# Patient Record
Sex: FEMALE | URBAN - METROPOLITAN AREA
[De-identification: names, ages, dates, MRNs, and addresses within clinical notes are randomized per-mention and may not be internally consistent; named-entity substitution may affect disease eponyms.]

---

## 2017-01-05 ENCOUNTER — TELEPHONE (OUTPATIENT)
Dept: GASTROENTEROLOGY | Facility: CLINIC | Age: 57
End: 2017-01-05

## 2017-01-05 NOTE — TELEPHONE ENCOUNTER
REFERRAL REVIEW FORM    Referred by: Dr. Toño Ambriz, PCP    Reason for referral: Chronic constipation    Date referral received: 12/22/16    Date records received: 12/22/16    Date records reviewed: 1/5/17    Previous work up:    Labs  EGD  Colonoscopy  GI evaluation - [facility]    Recommendation:    Schedule clinic appointment with general GI provider    When to schedule:  Next available slot    Date patient was contacted regarding scheduling:     Comments:   Pt previously saw [facility].

## 2017-01-09 NOTE — TELEPHONE ENCOUNTER
Spoke with medical records in Dr Toño Mckinney MD office. Waiting for colonoscpy report from 2014.Egd was done through another location she will look to see if patient had it done and will send it along with colonoscopy report.I will upload STAT once received.

## 2017-01-09 NOTE — TELEPHONE ENCOUNTER
Colonoscopy reports received will send to urgent scanning.Patient did not have Egd done but was called to schedule Upper GI small bowel follow through and will have that done today.

## 2017-01-09 NOTE — TELEPHONE ENCOUNTER
Called patient lvm to inform her we will need colonoscopy and egds she had done as referenced in her records. Our reviewing provider Dr Whalen would like to take a look at the reports before patients appointment.Per recommendations patient can be scheduled next available opening with general Gi provider. Waiting for call back to schedule.

## 2017-01-16 ENCOUNTER — TRANSFERRED RECORDS (OUTPATIENT)
Dept: HEALTH INFORMATION MANAGEMENT | Facility: CLINIC | Age: 57
End: 2017-01-16

## 2017-01-18 ENCOUNTER — DOCUMENTATION ONLY (OUTPATIENT)
Dept: GASTROENTEROLOGY | Facility: CLINIC | Age: 57
End: 2017-01-18

## 2017-01-18 NOTE — PROGRESS NOTES
Imaging report fro ugi xray and small bowel follow through received and sent to be scanned.  Route message to Ms. Walters  Borne next appt March.

## 2017-01-26 NOTE — TELEPHONE ENCOUNTER
Per Referral patient can be scheduled with Gi. Patient scheduled to see chava 3/24. Patient is aware.

## 2017-03-08 ENCOUNTER — TRANSFERRED RECORDS (OUTPATIENT)
Dept: HEALTH INFORMATION MANAGEMENT | Facility: CLINIC | Age: 57
End: 2017-03-08

## 2017-04-18 ENCOUNTER — PRE VISIT (OUTPATIENT)
Dept: GASTROENTEROLOGY | Facility: CLINIC | Age: 57
End: 2017-04-18

## 2017-04-18 NOTE — TELEPHONE ENCOUNTER
1.  Date/reason for appt: 4/28/17 chronic constipation   2.  Referring provider: none listed   3.  Call to patient (Yes / No - short description): no, records received   4.  Previous care at / records requested from: [facility]  5.  Other: Records received from [facility].   Included  Office notes: 11/28/16, 10/21/16, 10/18/16, 10/6/16, 9/6/16   [facility] notes: 11/8/15, 4/4/14   [facility] notes: 12/14/12  Radiology reports: abdomen on 10/4/16, 9/27/16, 4/21/16- [facility]    MRI lumbar on 9/8/16  Op/Procedure notes: upper GI endoscopy on 10/30/15- [facility]   Colonoscopy on 4/4/14-[facility]   Colonoscopy on 11/26/07-[facility]  Other: labs

## 2017-04-23 ASSESSMENT — ENCOUNTER SYMPTOMS
EXERCISE INTOLERANCE: 0
NUMBNESS: 0
SLEEP DISTURBANCES DUE TO BREATHING: 0
PANIC: 0
TROUBLE SWALLOWING: 0
DYSPNEA ON EXERTION: 1
DECREASED LIBIDO: 1
DOUBLE VISION: 0
NAIL CHANGES: 0
HALLUCINATIONS: 0
DIARRHEA: 0
HEMOPTYSIS: 0
MYALGIAS: 1
NECK PAIN: 1
NECK MASS: 0
ALTERED TEMPERATURE REGULATION: 0
INCREASED ENERGY: 1
TACHYCARDIA: 0
DECREASED APPETITE: 0
POOR WOUND HEALING: 0
BOWEL INCONTINENCE: 0
BLOATING: 1
DEPRESSION: 0
HEMATURIA: 0
TINGLING: 0
POLYDIPSIA: 0
POLYPHAGIA: 0
SINUS PAIN: 1
HYPERTENSION: 0
BLOOD IN STOOL: 0
LOSS OF CONSCIOUSNESS: 0
SINUS CONGESTION: 0
ABDOMINAL PAIN: 1
COUGH: 0
STIFFNESS: 1
FEVER: 0
TREMORS: 0
CONSTIPATION: 1
SORE THROAT: 0
POSTURAL DYSPNEA: 0
INSOMNIA: 1
SPUTUM PRODUCTION: 0
SPEECH CHANGE: 0
RESPIRATORY PAIN: 0
WEIGHT LOSS: 0
HOT FLASHES: 1
MUSCLE WEAKNESS: 1
MEMORY LOSS: 0
DIFFICULTY URINATING: 1
DIZZINESS: 1
LIGHT-HEADEDNESS: 0
VOMITING: 0
ORTHOPNEA: 0
PARALYSIS: 0
FATIGUE: 1
JAUNDICE: 0
WEIGHT GAIN: 1
BACK PAIN: 1
CHILLS: 0
NAUSEA: 1
DYSURIA: 0
MUSCLE CRAMPS: 1
EYE PAIN: 0
SKIN CHANGES: 0
LEG PAIN: 0
RECTAL PAIN: 1
FLANK PAIN: 0
CLAUDICATION: 0
LEG SWELLING: 0
DECREASED CONCENTRATION: 1
SNORES LOUDLY: 1
JOINT SWELLING: 1
HYPOTENSION: 0
EYE WATERING: 0
NERVOUS/ANXIOUS: 1
EYE REDNESS: 0
TASTE DISTURBANCE: 1
HEARTBURN: 1
WHEEZING: 0
COUGH DISTURBING SLEEP: 0
SHORTNESS OF BREATH: 1
DISTURBANCES IN COORDINATION: 0
SMELL DISTURBANCE: 0
WEAKNESS: 1
HEADACHES: 1
NIGHT SWEATS: 1
EYE IRRITATION: 0
SEIZURES: 0
PALPITATIONS: 1
HOARSE VOICE: 0
RECTAL BLEEDING: 0
SYNCOPE: 0
ARTHRALGIAS: 1

## 2017-04-28 ENCOUNTER — OFFICE VISIT (OUTPATIENT)
Dept: GASTROENTEROLOGY | Facility: CLINIC | Age: 57
End: 2017-04-28

## 2017-04-28 VITALS
DIASTOLIC BLOOD PRESSURE: 74 MMHG | SYSTOLIC BLOOD PRESSURE: 129 MMHG | WEIGHT: 155 LBS | OXYGEN SATURATION: 98 % | HEART RATE: 69 BPM | HEIGHT: 64 IN | BODY MASS INDEX: 26.46 KG/M2

## 2017-04-28 DIAGNOSIS — H91.92 HL (HEARING LOSS), LEFT: ICD-10-CM

## 2017-04-28 DIAGNOSIS — M19.90 ARTHRITIS: ICD-10-CM

## 2017-04-28 DIAGNOSIS — K90.9 STEATORRHEA: ICD-10-CM

## 2017-04-28 DIAGNOSIS — K59.00 CONSTIPATION, UNSPECIFIED CONSTIPATION TYPE: ICD-10-CM

## 2017-04-28 DIAGNOSIS — R13.19 ESOPHAGEAL DYSPHAGIA: Primary | ICD-10-CM

## 2017-04-28 RX ORDER — MULTIVITAMIN WITH IRON
1 TABLET ORAL DAILY
COMMUNITY

## 2017-04-28 RX ORDER — TRAZODONE HYDROCHLORIDE 50 MG/1
100 TABLET, FILM COATED ORAL
COMMUNITY
Start: 2013-03-28 | End: 2017-08-11

## 2017-04-28 RX ORDER — METFORMIN HCL 500 MG
1000 TABLET, EXTENDED RELEASE 24 HR ORAL DAILY
COMMUNITY

## 2017-04-28 RX ORDER — DIETHYLPROPION HYDROCHLORIDE 25 MG/1
25 TABLET ORAL
COMMUNITY
End: 2017-08-11

## 2017-04-28 RX ORDER — CHLORAL HYDRATE 500 MG
3 CAPSULE ORAL 3 TIMES DAILY
COMMUNITY

## 2017-04-28 ASSESSMENT — PAIN SCALES - GENERAL: PAINLEVEL: MODERATE PAIN (4)

## 2017-04-28 NOTE — PATIENT INSTRUCTIONS
Follow lifestyle precautions against acid reflux (GERD)    Do not overeat.   Avoid meals and snacks within three or four hours of lying down.   Avoid alcohol and mint. Decrease or quit smoking.   Do not drink carbonation - it IS acid. Decrease or quit caffeine.   If you have nighttime symptoms, elevate the head of the bed    first 3 inches, then 6 to 8 inches.    A foam wedge from the hip may be helpful, if a person lies on their back.    Pillows do NOT work. The entire back must be straight.   Lose weight if you are overweight.    Even ten pounds weight loss can make a difference.  Some people also have specific triggers: tomato, spice, chocolate, citrus/orange juice.      Continue the linaclotide (Linzess)     Consideration for Trulance (prucalapride)     Esophageal manometry (motility) and pH impedence studies are advised.  This is done by a nurse in the Endoscopy department in the [facility].     To schedule/rescheduled the tests or for questions regarding the tests please call [phone].     Esophageal Manometry (AKA Motility Study) - Esophageal manometry is a test to measure the strength and function of the esophagus (the  food pipe ). During the test, lubrication will be placed in your nose and a small tube is placed through your nose and moved down your esophagus and into your stomach. You will usually lie down for this test. You will be given 10 swallows of salt water.   This test takes 1 hour. After this test is completed, a 24 hour PH test will be completed.     24 Hour PH Impedance - In this test, the first tube, from the Manometry, is replaced with a smaller one (the size of a spaghetti noodle). The tube is taped into place and attached to a recorder that you will wear home. This machine will record how many episodes of reflux you have during the next 24 hours.   You will return the next day to return the recorder and the tube will be removed (this part only takes 5 minutes).    Preparations for  Manometry and 24 Hour PH Impedance Testing:   Be sure to talk to your doctor about medications you take.    Sometimes certain medications are stopped.   Be sure not to smoke or chew gum for up to 12 hours before the test.   Do not eat or drink for 4 hours before the test.     The results of these studies often take up to 4 weeks to get back.   If you have not received your results within 5 weeks please call the nurse coordinator to check the status at [phone].     the upper GI endoscopy (EGD) with dilation  UPPER ENDOSCOPY (also known as EGD- esophageogastroduodenoscopy)    To schedule, please call [phone], or you can schedule at the  after your appointment.    You will need a  to bring you home from the exam.  You may not take a cab home unless you have an adult with you.    If you have diabetes or are on blood thinners: talk with your doctor at least one week before the exam.  They may want you to change your medicine before the test.    This exam looks at the lining of your esophagus, stomach and duodenum (first part of the small intestine).    Do not eat of drink anything for 6-8 hours before your exam.    During this procedure, the doctor will help you to swallow a flexible tube called an endoscope. This endoscope has a small camera that lets the doctor see inside your body. The exam last from 10-20 minutes.    A small IV will be placed in your hand or arm, and medicine will be given to you through this IV to help you relax and reduce pain. They will spray your throat with numbing medicine and ask you to swallow to assist the doctor in passing the tube into your stomach. Also, biopsies may be taken to test in the lab and pictures may be taken of your esophagus, stomach, and duodenum.    You will rest in the recovery room for 30-60 minutes following the exam. Your throat may be numb for a short while and may be sore for a few days.     Return to GI Clinic 3 months       Selena Aguilar,  CFNP Gastroenterology   For appointments call [facility], [phone]  Coordinator  [phone] [place] or call -  GI Nurse Triage  [phone] for Medical Questions, # 3  Fax results to

## 2017-04-28 NOTE — NURSING NOTE
"Chief Complaint   Patient presents with     Consult     Constipation       Vitals:    04/28/17 1235   BP: 129/74   Pulse: 69   SpO2: 98%   Weight: 70.3 kg (155 lb)   Height: 1.626 m (5' 4\")       Body mass index is 26.61 kg/(m^2).                          "

## 2017-04-28 NOTE — LETTER
4/28/2017       RE: Jaylene Pillai  [address] [address]     Dear Colleague,    Thank you for referring your patient, Jaylene Pillai, to the [facility] GASTROENTEROLOGY AND IBD at [facility]. Please see a copy of my visit note below.    CHIEF COMPLAINT: constipation and esophageal dysphagia    HPI: Jaylene is a 57 year old woman with a recurrent esophageal dysphagia and chronic constipation of about 15 years. She has history of upper GI endoscopy (EGD) with dilation on two previous procedures, with temporary resolution of food impaction each time. Now, her dysphagia is worsening for foods. She does not have cough or choke with liquids. She notes increase in acid reflux (GERD) symptoms with no known reason. She has symptoms of acid reflux (GERD) about once weekly even avoiding wine and spice. She has increase in burping and belching and some nausea but no vomiting. She has normal appetite, no early satiety, no late burps of tastes from earlier foods.    Jaylene has worsened constipation as well. She has used fibers, Miralax, senna, bisacodyl, lactulose, TWE, docusate, and other agents in the past. She is now on linaclotide 290 mcg; the lower dose did not work for her [an uncommon difference]. If she uses it daily, she will have daily loose stool, which overall is better than other means of managing her constipation. It also decreases her bloat much. Last fall she was seen by [facility] and they said, they did n't know what to do for her. After three weeks no BM, at ER she was given an injection and told to go home for BM, but had none. She has had transit marker study, pelvic floor training, no benefit from that and stated clear ability to relax the pelvic floor. Last colonoscopy was 2014; she has no history of polyps. She has had surgical repair of rectocele and no longer needs to splint.    Jaylene has seen bariatric medicine for weight loss and has a new medication,  "diethylpropion. She does not link symptoms with this medication.    Jaylene has chronic joint pain, cartilage loss, degenerative disk disease and is LILIA+. She carried the diagnosis of spondiyloarthropathy. She was on Enbrell a couple years and felt great. She then had a sudden left hearing loss and total body rash, which were attributed to a virus. She was seen at [facility] who did no investigations. She has used naproxen 3-4 times a week on and off. She uses Tramadol about once a month.     Medical History: reviewed in outside records. Partially updated in the local record.    Surgical History: reviewed, updated, and annotated.. Cholecystectomy this year Feb decreased RUQ pain.    Family History: mother had rheumatoid arthritis, maternal aunt had Celiac Disease. A cousine has scleroderma. Sone with back and intestinal issues; tested negative for Celiac Disease.    Medication(s). Reviewed and updated. Has stopped the cyclobenzaprine with no benefit for her swallowing.    Adverse reactions: Reviewed.    Social history: . Non-smoker. Little infrequent alcohol. No street drugs.     REVIEW OF SYSTEMS:: largely positive in multiple symptoms. No acute illness or localized infection. UTI - received Bactrim x1 and Augmentin x1 late 2016. Not short of breath. No melena or hematochezia. Questionnaire reviewed. Significant elements discussed.    OBJECTIVE: /74  Pulse 69  Ht 1.626 m (5' 4\")  Wt 70.3 kg (155 lb)  SpO2 98%  BMI 26.61 kg/m2  Clean, well-nourished woman in no visible distress when seated. Moves slowly on her feet.  Eyes arfe clear.  Breathing is grossly normal.  Abdomen is non-tender.      RESULTS:    UGI SBFT 1/2017 normal  .  Abdomen films 2016 x3 constipation one time, no significant GI abnormality.  Colonoscopy 4/2014 no polyps  Transit marker study  Date? 14 markers remaining, 11 in rectosigmoid.  [facility] studies 12/2012 rectocele, possible non-relaxation, otherwise " normal.  Labs 2017 normal CBC, CMP, Ferritin, UA. Thyroid studies with low TSH - dose decreased. Mixed hyperlipidemia.    ASSESSMENT: 57 year old woman with recurrent esophageal dysphagia to solids, with previous dilation providing short-lived resolution of symptoms, dysphagia now worsening. She also has history of Irritable Bowel Syndrome per outside record and constipation of 15 or so years. Constipation is moderately well managed with linaclotide higher dose only, resulting in loose stool. Stool is nonetheless often malodorous and occasionally oily appearing, raising the question of steatorrhea.       Note: in the future, she may be a candidate for plecanatide, for management of constipation with less resulting diarrhea.     PLAN:    1. Upper GI endoscopy (EGD) with possible dilation  2. Esophagus manometry and 24 hour pH/impedance  3. Stool for elastase  4. Continue to follow acid reflux (GERD) precautions.  5. Continue bowel management.  6. Call or message with GI changes.  7. Return to GI Clinic in a couple months.    We discussed the history/results, assessment and plan. Specifically, the nature of the esophageal studies and reasons for them were discussed. ruling out other disease causing dysphagia and documenting need for acid control would be important. She has no symptoms of gastroparesis. Her new medication may be the cause of nausea, possible increase in acid reflux (GERD), as well as constipation.  Questions were answered. Written notes were provided.    There were no barriers to learning found.    Total visit 60 min, counseling time 40 min.            Answers for HPI/ROS submitted by the patient on 4/23/2017   General Symptoms: Yes  Skin Symptoms: Yes  HENT Symptoms: Yes  EYE SYMPTOMS: Yes  HEART SYMPTOMS: Yes  LUNG SYMPTOMS: Yes  INTESTINAL SYMPTOMS: Yes  URINARY SYMPTOMS: Yes  GYNECOLOGIC SYMPTOMS: Yes  BREAST SYMPTOMS: No  SKELETAL SYMPTOMS: Yes  BLOOD SYMPTOMS: No  NERVOUS SYSTEM SYMPTOMS:  Yes  MENTAL HEALTH SYMPTOMS: Yes  Fever: No  Loss of appetite: No  Weight loss: No  Weight gain: Yes  Fatigue: Yes  Night sweats: Yes  Chills: No  Increased stress: Yes  Excessive hunger: No  Excessive thirst: No  Feeling hot or cold when others believe the temperature is normal: No  Loss of height: No  Post-operative complications: No  Surgical site pain: No  Hallucinations: No  Change in or Loss of Energy: Yes  Hyperactivity: No  Confusion: No  Changes in hair: Yes  Changes in moles/birth marks: No  Itching: No  Rashes: No  Changes in nails: No  Acne: No  Hair in places you don't want it: No  Change in facial hair: No  Warts: No  Non-healing sores: No  Scarring: No  Flaking of skin: Yes  Color changes of hands/feet in cold : No  Sun sensitivity: No  Skin thickening: No  Ear pain: No  Ear discharge: No  Hearing loss: Yes  Tinnitus: Yes  Nosebleeds: No  Congestion: No  Sinus pain: Yes  Trouble swallowing: No   Voice hoarseness: No  Mouth sores: No  Sore throat: No  Tooth pain: No  Gum tenderness: No  Bleeding gums: No  Change in taste: Yes  Change in sense of smell: No  Dry mouth: No  Hearing aid used: Yes  Neck lump: No  Eye pain: No  Vision loss: No  Dry eyes: Yes  Watery eyes: No  Eye bulging: No  Double vision: No  Flashing of lights: No  Spots: No  Floaters: Yes  Redness: No  Crossed eyes: No  Tunnel Vision: No  Yellowing of eyes: No  Eye irritation: No  Cough: No  Sputum or phlegm: No  Coughing up blood: No  Difficulty breating or shortness of breath: Yes  Snoring: Yes  Wheezing: No  Difficulty breathing on exertion: Yes  Respiratory pain: No  Nighttime Cough: No  Difficulty breathing when lying flat: No  Chest pain or pressure: No  Fast or irregular heartbeat: Yes  Pain in legs with walking: No  Swelling in feet or ankles: No  Trouble breathing while lying down: No  Fingers or Toes appear blue: No  High blood pressure: No  Low blood pressure: No  Fainting: No  Murmurs: No  Chest pain on exertion: No  Chest  pain at rest: No  Cramping pain in leg during exercise: No  Pacemaker: No  Varicose veins: Yes  Edema or swelling: Yes  Fast heart beat: No  Wake up at night with shortness of breath: No  Heart flutters: Yes  Light-headedness: No  Exercise intolerance: No  Heart burn or indigestion: Yes  Nausea: Yes  Vomiting: No  Abdominal pain: Yes  Bloating: Yes  Constipation: Yes  Diarrhea: No  Blood in stool: No  Black stools: No  Rectal or Anal pain: Yes  Fecal incontinence: No  Rectal bleeding: No  Yellowing of skin or eyes: No  Vomit with blood: No  Change in stools: No  Hemorrhoids: Yes  Trouble holding urine or incontinence: No  Pain or burning: No  Trouble starting or stopping: Yes  Blood in urine: No  Decreased frequency of urination: No  Frequent nighttime urination: No  Flank pain: No  Difficulty emptying bladder: Yes  Back pain: Yes  Muscle aches: Yes  Neck pain: Yes  Swollen joints: Yes  Joint pain: Yes  Bone pain: No  Muscle cramps: Yes  Muscle weakness: Yes  Joint stiffness: Yes  Bone fracture: No  Trouble with coordination: No  Dizziness or trouble with balance: Yes  Fainting or black-out spells: No  Memory loss: No  Headache: Yes  Seizures: No  Speech problems: No  Tingling: No  Tremor: No  Weakness: Yes  Difficulty walking: No  Paralysis: No  Numbness: No  Bleeding or spotting between periods: No  Heavy or painful periods: No  Irregular periods: No  Vaginal discharge: No  Hot flashes: Yes  Vaginal dryness: Yes  Genital ulcers: No  Reduced libido: Yes  Painful intercourse: Yes  Difficulty with sexual arousal: Yes  Post-menopausal bleeding: No  Nervous or Anxious: Yes  Depression: No  Trouble sleeping: Yes  Trouble thinking or concentrating: Yes  Mood changes: Yes  Panic attacks: No      Again, thank you for allowing me to participate in the care of your patient.      Sincerely,    YAHIR Mehta CNP

## 2017-04-28 NOTE — MR AVS SNAPSHOT
After Visit Summary   4/28/2017    Jaylene Pillai    MRN: [id number]           Patient Information     Date Of Birth          1960        Visit Information        Provider Department      4/28/2017 1:00 PM Selena Aguilar APRN CNP [facility]        Today's Diagnoses     Esophageal dysphagia    -  1    Constipation, unspecified constipation type        Steatorrhea          Care Instructions    Follow lifestyle precautions against acid reflux (GERD)    Do not overeat.   Avoid meals and snacks within three or four hours of lying down.   Avoid alcohol and mint. Decrease or quit smoking.   Do not drink carbonation - it IS acid. Decrease or quit caffeine.   If you have nighttime symptoms, elevate the head of the bed    first 3 inches, then 6 to 8 inches.    A foam wedge from the hip may be helpful, if a person lies on their back.    Pillows do NOT work. The entire back must be straight.   Lose weight if you are overweight.    Even ten pounds weight loss can make a difference.  Some people also have specific triggers: tomato, spice, chocolate, citrus/orange juice.      Continue the linaclotide (Linzess)     Consideration for Trulance (prucalapride)     Esophageal manometry (motility) and pH impedence studies are advised.  This is done by a nurse in the Endoscopy department in the [facility] hospital.     To schedule/rescheduled the tests or for questions regarding the tests please call [phone].     Esophageal Manometry (AKA Motility Study) - Esophageal manometry is a test to measure the strength and function of the esophagus (the  food pipe ). During the test, lubrication will be placed in your nose and a small tube is placed through your nose and moved down your esophagus and into your stomach. You will usually lie down for this test. You will be given 10 swallows of salt water.   This test takes 1 hour. After this test is completed, a 24 hour PH test will be completed.     24 Hour  PH Impedance - In this test, the first tube, from the Manometry, is replaced with a smaller one (the size of a spaghetti noodle). The tube is taped into place and attached to a recorder that you will wear home. This machine will record how many episodes of reflux you have during the next 24 hours.   You will return the next day to return the recorder and the tube will be removed (this part only takes 5 minutes).    Preparations for Manometry and 24 Hour PH Impedance Testing:   Be sure to talk to your doctor about medications you take.    Sometimes certain medications are stopped.   Be sure not to smoke or chew gum for up to 12 hours before the test.   Do not eat or drink for 4 hours before the test.     The results of these studies often take up to 4 weeks to get back.   If you have not received your results within 5 weeks please call the nurse coordinator to check the status at [phone].     the upper GI endoscopy (EGD) with dilation  UPPER ENDOSCOPY (also known as EGD- esophageogastroduodenoscopy)    To schedule, please call [phone], or you can schedule at the  after your appointment.    You will need a  to bring you home from the exam.  You may not take a cab home unless you have an adult with you.    If you have diabetes or are on blood thinners: talk with your doctor at least one week before the exam.  They may want you to change your medicine before the test.    This exam looks at the lining of your esophagus, stomach and duodenum (first part of the small intestine).    Do not eat of drink anything for 6-8 hours before your exam.    During this procedure, the doctor will help you to swallow a flexible tube called an endoscope. This endoscope has a small camera that lets the doctor see inside your body. The exam last from 10-20 minutes.    A small IV will be placed in your hand or arm, and medicine will be given to you through this IV to help you relax and reduce pain. They will spray  your throat with numbing medicine and ask you to swallow to assist the doctor in passing the tube into your stomach. Also, biopsies may be taken to test in the lab and pictures may be taken of your esophagus, stomach, and duodenum.    You will rest in the recovery room for 30-60 minutes following the exam. Your throat may be numb for a short while and may be sore for a few days.     Return to GI Clinic 3 months       [facility]   For appointments call Angelina, [phone]  Coordinator  [phone] Sharon or call -  GI Nurse Triage  [phone] for Medical Questions, # 3  Fax results to                Follow-ups after your visit        Additional Services     GASTROENTEROLOGY ADULT REF PROCEDURE ONLY       Last Lab Result: No results found for: CR  Body mass index is 26.61 kg/(m^2).     Needed:  No  Language:  English    Patient will be contacted to schedule procedure.     Please be aware that coverage of these services is subject to the terms and limitations of your health insurance plan.  Call member services at your health plan with any benefit or coverage questions.  Any procedures must be performed at a [facility] facility OR coordinated by your clinic's referral office.    Please bring the following with you to your appointment:    (1) Any X-Rays, CTs or MRIs which have been performed.  Contact the facility where they were done to arrange for  prior to your scheduled appointment.    (2) List of current medications   (3) This referral request   (4) Any documents/labs given to you for this referral            GASTROENTEROLOGY ADULT REF PROCEDURE ONLY       Last Lab Result: No results found for: CR  Body mass index is 26.61 kg/(m^2).     Needed:  No  Language:  English    Patient will be contacted to schedule procedure.     Please be aware that coverage of these services is subject to the terms and limitations of your health insurance plan.  Call  member services at your health plan with any benefit or coverage questions.  Any procedures must be performed at a [facility] facility OR coordinated by your clinic's referral office.    Please bring the following with you to your appointment:    (1) Any X-Rays, CTs or MRIs which have been performed.  Contact the facility where they were done to arrange for  prior to your scheduled appointment.    (2) List of current medications   (3) This referral request   (4) Any documents/labs given to you for this referral                  Follow-up notes from your care team     Return in about 3 months (around 7/28/2017).      Future tests that were ordered for you today     Open Future Orders        Priority Expected Expires Ordered    Pancreatic Elastase Fecal Routine  4/28/2018 4/28/2017            Who to contact     Please call your clinic at [phone] to:    Ask questions about your health    Make or cancel appointments    Discuss your medicines    Learn about your test results    Speak to your doctor   If you have compliments or concerns about an experience at your clinic, or if you wish to file a complaint, please contact [facility] Patient Relations at [phone] or email us at [email or web address]         Additional Information About Your Visit        [employer] Information     [employer] gives you secure access to your electronic health record. If you see a primary care provider, you can also send messages to your care team and make appointments. If you have questions, please call your primary care clinic.  If you do not have a primary care provider, please call [phone] and they will assist you.      [employer] is an electronic gateway that provides easy, online access to your medical records. With [employer], you can request a clinic appointment, read your test results, renew a prescription or communicate with your care team.     To access your existing account, please  "contact your [facility] or call [phone] for assistance.        Care EveryWhere ID     This is your Care EveryWhere ID. This could be used by other organizations to access your [place] medical records  [id number]        Your Vitals Were     Pulse Height Pulse Oximetry BMI (Body Mass Index)          69 1.626 m (5' 4\") 98% 26.61 kg/m2         Blood Pressure from Last 3 Encounters:   04/28/17 129/74   10/31/12 114/74    Weight from Last 3 Encounters:   04/28/17 70.3 kg (155 lb)   10/31/12 70.3 kg (155 lb)              We Performed the Following     GASTROENTEROLOGY ADULT REF PROCEDURE ONLY     GASTROENTEROLOGY ADULT REF PROCEDURE ONLY        Primary Care Provider    Physician No Ref-Primary       No address on file        Thank you!     Thank you for choosing [facility]  for your care. Our goal is always to provide you with excellent care. Hearing back from our patients is one way we can continue to improve our services. Please take a few minutes to complete the written survey that you may receive in the mail after your visit with us. Thank you!             Your Updated Medication List - Protect others around you: Learn how to safely use, store and throw away your medicines at www.disposemymeds.org.          This list is accurate as of: 4/28/17  2:42 PM.  Always use your most recent med list.                   Brand Name Dispense Instructions for use    diethylpropion 25 MG Tabs tablet    TENUATE     Take 25 mg by mouth 2 times daily (before meals)       fish oil-omega-3 fatty acids 1000 MG capsule      Take 3 g by mouth 3 times daily       LINZESS PO      Take 290 mcg by mouth every morning (before breakfast)       magnesium 250 MG tablet      Take 1 tablet by mouth daily       metFORMIN 500 MG 24 hr tablet    GLUCOPHAGE-XR     Take 1,000 mg by mouth daily       PREMARIN PO      Take 0.625 mg by mouth daily       PRO-BIOTIC BLEND Caps      Take 1 capsule by " mouth 3 times daily       * Thyroid 97.5 MG Tabs      Take 97.5 mg by mouth every morning       * Thyroid 81.25 MG Tabs      Take 81.25 mg by mouth every morning       * TRAZODONE HCL      50 mg 2xqd hs       * traZODone 50 MG tablet    DESYREL     Take 100 mg by mouth       ULTRAM PO      Reported on 4/28/2017       VITAMIN D PO      Take  by mouth. 2000 mg       * Notice:  This list has 4 medication(s) that are the same as other medications prescribed for you. Read the directions carefully, and ask your doctor or other care provider to review them with you.

## 2017-04-28 NOTE — LETTER
Date:May 12, 2017      Patient was self referred, no letter generated. Do not send.        [facility] Information

## 2017-05-04 ENCOUNTER — TELEPHONE (OUTPATIENT)
Dept: GASTROENTEROLOGY | Facility: OUTPATIENT CENTER | Age: 57
End: 2017-05-04

## 2017-05-10 ENCOUNTER — SURGERY (OUTPATIENT)
Age: 57
End: 2017-05-10

## 2017-05-10 ENCOUNTER — HOSPITAL ENCOUNTER (OUTPATIENT)
Facility: CLINIC | Age: 57
Discharge: HOME | End: 2017-05-10
Attending: INTERNAL MEDICINE | Admitting: INTERNAL MEDICINE
Payer: COMMERCIAL

## 2017-05-10 ENCOUNTER — TELEPHONE (OUTPATIENT)
Dept: GASTROENTEROLOGY | Facility: CLINIC | Age: 57
End: 2017-05-10

## 2017-05-10 PROCEDURE — 91038 ESOPH IMPED FUNCT TEST > 1HR: CPT | Performed by: INTERNAL MEDICINE

## 2017-05-10 NOTE — IP AVS SNAPSHOT
[facility], [place], Endoscopy    [address] [facility] [address]    Phone:  [phone]                                       After Visit Summary   5/10/2017    Jaylene Pillai    MRN: [id number]           After Visit Summary Signature Page     I have received my discharge instructions, and my questions have been answered. I have discussed any challenges I see with this plan with the nurse or doctor.    ..........................................................................................................................................  Patient/Patient Representative Signature      ..........................................................................................................................................  Patient Representative Print Name and Relationship to Patient    ..................................................               ................................................  Date                                            Time    ..........................................................................................................................................  Reviewed by Signature/Title    ...................................................              ..............................................  Date                                                            Time

## 2017-05-10 NOTE — DISCHARGE INSTRUCTIONS
1.  May resume regular diet.    2.  May have bloody nose, stuffy nose or sore throat after procedure.    3.  If 24 pH probe placed, return the next day to have probe removed.  Removal will only        take 5 minutes.    4.  Any questions call [phone] from 7AM to 4:30PM.  After hours call [phone]      and ask for GI fellow on call.

## 2017-05-10 NOTE — OR NURSING
Pt here for manometry/24 hr ph. Procedure explained.  Catheter then placed easily. Catheter calibrated and pulled back to 580   cm for swallows. Swallows given per protocol and pt tolerated  well. Catheter removed. Ph probe then placed. Pt tolerated well. Probe pulled to 35   cm for study.DC and diary instructions given. Pt dc to home in NAD.

## 2017-05-10 NOTE — IP AVS SNAPSHOT
MRN:[id number]                      After Visit Summary   5/10/2017    Jaylene Pillai    MRN: [id number]           Thank you!     Thank you for choosing [facility] for your care. Our goal is always to provide you with excellent care. Hearing back from our patients is one way we can continue to improve our services. Please take a few minutes to complete the written survey that you may receive in the mail after you visit with us. Thank you!        Patient Information     Date Of Birth          1960        About your hospital stay     You were admitted on:  May 10, 2017 You last received care in the:  [facility], Endoscopy    You were discharged on:  May 10, 2017       Who to Call     For medical emergencies, please call 911.  For non-urgent questions about your medical care, please call your primary care provider or clinic, None  For questions related to your surgery, please call your surgery clinic        Attending Provider     Provider Earnest Escobedo MD Gastroenterology       Primary Care Provider    Physician No Ref-Primary       No address on file        Your next 10 appointments already scheduled     May 11, 2017 11:30 AM CDT   Upper Endoscopy with Tao Rod MD   [facility] ([facility])    [address] [place] [address]   [phone]            Aug 11, 2017  1:00 PM CDT   (Arrive by 12:45 PM)   Return Visit with YAHIR Mehta [facility] IBD ([facility])    [address] [id number] [phone]              Further instructions from your care team       1.  May resume regular diet.    2.  May have bloody nose, stuffy nose or sore throat after procedure.    3.  If 24 pH probe placed, return the next day to have probe removed.  Removal will only        take 5 minutes.    4.  Any questions call [phone] from 7AM  to 4:30PM.  After hours call [phone]      and ask for GI fellow on call.      Pending Results     No orders found from 5/8/2017 to 5/11/2017.            Admission Information     Date & Time Provider Department Dept. Phone    5/10/2017 Earnest Thompson MD [facility], [place], Endoscopy [phone]      MyChart Information     [employer] gives you secure access to your electronic health record. If you see a primary care provider, you can also send messages to your care team and make appointments. If you have questions, please call your primary care clinic.  If you do not have a primary care provider, please call [phone] and they will assist you.        Care EveryWhere ID     This is your Care EveryWhere ID. This could be used by other organizations to access your [place] medical records  [id number]           Review of your medicines      UNREVIEWED medicines. Ask your doctor about these medicines        Dose / Directions    diethylpropion 25 MG Tabs tablet   Commonly known as:  TENUATE        Dose:  25 mg   Take 25 mg by mouth 2 times daily (before meals)   Refills:  0       fish oil-omega-3 fatty acids 1000 MG capsule        Dose:  3 g   Take 3 g by mouth 3 times daily   Refills:  0       LINZESS PO        Dose:  290 mcg   Take 290 mcg by mouth every morning (before breakfast)   Refills:  0       magnesium 250 MG tablet        Dose:  1 tablet   Take 1 tablet by mouth daily   Refills:  0       metFORMIN 500 MG 24 hr tablet   Commonly known as:  GLUCOPHAGE-XR        Dose:  1000 mg   Take 1,000 mg by mouth daily   Refills:  0       PREMARIN PO        Dose:  0.625 mg   Take 0.625 mg by mouth daily   Refills:  0       PRO-BIOTIC BLEND Caps        Dose:  1 capsule   Take 1 capsule by mouth 3 times daily   Refills:  0       * Thyroid 97.5 MG Tabs        Dose:  97.5 mg   Take 97.5 mg by mouth every morning   Refills:  0       * Thyroid 81.25 MG Tabs   Indication:  Underactive Thyroid        Dose:  81.25 mg   Take  81.25 mg by mouth every morning   Refills:  0       * TRAZODONE HCL        50 mg 2xqd hs   Refills:  0       * traZODone 50 MG tablet   Commonly known as:  DESYREL        Dose:  100 mg   Take 100 mg by mouth   Refills:  0       ULTRAM PO        Reported on 4/28/2017   Refills:  0       VITAMIN D PO        Take  by mouth. 2000 mg   Refills:  0       * Notice:  This list has 4 medication(s) that are the same as other medications prescribed for you. Read the directions carefully, and ask your doctor or other care provider to review them with you.             Protect others around you: Learn how to safely use, store and throw away your medicines at www.disposemymeds.org.             Medication List: This is a list of all your medications and when to take them. Check marks below indicate your daily home schedule. Keep this list as a reference.      Medications           Morning Afternoon Evening Bedtime As Needed    diethylpropion 25 MG Tabs tablet   Commonly known as:  TENUATE   Take 25 mg by mouth 2 times daily (before meals)                                fish oil-omega-3 fatty acids 1000 MG capsule   Take 3 g by mouth 3 times daily                                LINZESS PO   Take 290 mcg by mouth every morning (before breakfast)                                magnesium 250 MG tablet   Take 1 tablet by mouth daily                                metFORMIN 500 MG 24 hr tablet   Commonly known as:  GLUCOPHAGE-XR   Take 1,000 mg by mouth daily                                PREMARIN PO   Take 0.625 mg by mouth daily                                PRO-BIOTIC BLEND Caps   Take 1 capsule by mouth 3 times daily                                * Thyroid 97.5 MG Tabs   Take 97.5 mg by mouth every morning                                * Thyroid 81.25 MG Tabs   Take 81.25 mg by mouth every morning                                * TRAZODONE HCL   50 mg 2xqd hs                                * traZODone 50 MG tablet   Commonly  known as:  DESYREL   Take 100 mg by mouth                                ULTRAM PO   Reported on 4/28/2017                                VITAMIN D PO   Take  by mouth. 2000 mg                                * Notice:  This list has 4 medication(s) that are the same as other medications prescribed for you. Read the directions carefully, and ask your doctor or other care provider to review them with you.

## 2017-05-10 NOTE — TELEPHONE ENCOUNTER
Jaylene is calling for Selena Aguilar.  She reports she's here for testing today and tomorrow that Selena ordered.  She was told that her provider needs to get PA prior to these tests being done and this did not happen.  She already had one test, and is scheduled for another tomorrow.  Call [facility] [phone]. Her ID # [id number].    Will route to Leslye Herrera's clinic nurse.

## 2017-05-11 ENCOUNTER — DOCUMENTATION ONLY (OUTPATIENT)
Dept: GASTROENTEROLOGY | Facility: OUTPATIENT CENTER | Age: 57
End: 2017-05-11

## 2017-05-11 ENCOUNTER — TRANSFERRED RECORDS (OUTPATIENT)
Dept: HEALTH INFORMATION MANAGEMENT | Facility: CLINIC | Age: 57
End: 2017-05-11

## 2017-05-11 PROBLEM — K59.00 CONSTIPATION, UNSPECIFIED CONSTIPATION TYPE: Status: ACTIVE | Noted: 2017-05-11

## 2017-05-11 PROBLEM — H91.92 HL (HEARING LOSS), LEFT: Status: ACTIVE | Noted: 2017-05-11

## 2017-05-11 PROBLEM — K58.1 IRRITABLE BOWEL SYNDROME WITH CONSTIPATION: Status: ACTIVE | Noted: 2017-05-11

## 2017-05-11 PROBLEM — E78.2 MIXED HYPERLIPIDEMIA: Status: ACTIVE | Noted: 2017-05-11

## 2017-05-11 PROBLEM — M79.7 FIBROMYALGIA: Status: ACTIVE | Noted: 2017-05-11

## 2017-05-11 PROBLEM — M19.90 ARTHRITIS: Status: ACTIVE | Noted: 2017-05-11

## 2017-05-11 NOTE — PROGRESS NOTES
CHIEF COMPLAINT: constipation and esophageal dysphagia    HPI: Jaylene is a 57 year old woman with a recurrent esophageal dysphagia and chronic constipation of about 15 years. She has history of upper GI endoscopy (EGD) with dilation on two previous procedures, with temporary resolution of food impaction each time. Now, her dysphagia is worsening for foods. She does not have cough or choke with liquids. She notes increase in acid reflux (GERD) symptoms with no known reason. She has symptoms of acid reflux (GERD) about once weekly even avoiding wine and spice. She has increase in burping and belching and some nausea but no vomiting. She has normal appetite, no early satiety, no late burps of tastes from earlier foods.    Jaylene has worsened constipation as well. She has used fibers, Miralax, senna, bisacodyl, lactulose, TWE, docusate, and other agents in the past. She is now on linaclotide 290 mcg; the lower dose did not work for her [an uncommon difference]. If she uses it daily, she will have daily loose stool, which overall is better than other means of managing her constipation. It also decreases her bloat much. Last fall she was seen by [facility] and they said, they did n't know what to do for her. After three weeks no BM, at ER she was given an injection and told to go home for BM, but had none. She has had transit marker study, pelvic floor training, no benefit from that and stated clear ability to relax the pelvic floor. Last colonoscopy was 2014; she has no history of polyps. She has had surgical repair of rectocele and no longer needs to splint.    Jaylene has seen bariatric medicine for weight loss and has a new medication, diethylpropion. She does not link symptoms with this medication.    Jaylene has chronic joint pain, cartilage loss, degenerative disk disease and is LILIA+. She carried the diagnosis of spondiyloarthropathy. She was on Enbrell a couple years and felt great. She then had a sudden left hearing loss and  "total body rash, which were attributed to a virus. She was seen at [facility] who did no investigations. She has used naproxen 3-4 times a week on and off. She uses Tramadol about once a month.     Medical History: reviewed in outside records. Partially updated in the local record.    Surgical History: reviewed, updated, and annotated.. Cholecystectomy this year Feb decreased RUQ pain.    Family History: mother had rheumatoid arthritis, maternal aunt had Celiac Disease. A cousine has scleroderma. Sone with back and intestinal issues; tested negative for Celiac Disease.    Medication(s). Reviewed and updated. Has stopped the cyclobenzaprine with no benefit for her swallowing.    Adverse reactions: Reviewed.    Social history: . Non-smoker. Little infrequent alcohol. No street drugs.     REVIEW OF SYSTEMS:: largely positive in multiple symptoms. No acute illness or localized infection. UTI - received Bactrim x1 and Augmentin x1 late 2016. Not short of breath. No melena or hematochezia. Questionnaire reviewed. Significant elements discussed.    OBJECTIVE: /74  Pulse 69  Ht 1.626 m (5' 4\")  Wt 70.3 kg (155 lb)  SpO2 98%  BMI 26.61 kg/m2  Clean, well-nourished woman in no visible distress when seated. Moves slowly on her feet.  Eyes arfe clear.  Breathing is grossly normal.  Abdomen is non-tender.      RESULTS:    UGI SBFT 1/2017 normal  .  Abdomen films 2016 x3 constipation one time, no significant GI abnormality.  Colonoscopy 4/2014 no polyps  Transit marker study  Date? 14 markers remaining, 11 in rectosigmoid.  Pelvic Floor Center studies 12/2012 rectocele, possible non-relaxation, otherwise normal.  Labs 2017 normal CBC, CMP, Ferritin, UA. Thyroid studies with low TSH - dose decreased. Mixed hyperlipidemia.    ASSESSMENT: 57 year old woman with recurrent esophageal dysphagia to solids, with previous dilation providing short-lived resolution of symptoms, dysphagia now worsening. She also has " history of Irritable Bowel Syndrome per outside record and constipation of 15 or so years. Constipation is moderately well managed with linaclotide higher dose only, resulting in loose stool. Stool is nonetheless often malodorous and occasionally oily appearing, raising the question of steatorrhea.       Note: in the future, she may be a candidate for plecanatide, for management of constipation with less resulting diarrhea.     PLAN:    1. Upper GI endoscopy (EGD) with possible dilation  2. Esophagus manometry and 24 hour pH/impedance  3. Stool for elastase  4. Continue to follow acid reflux (GERD) precautions.  5. Continue bowel management.  6. Call or message with GI changes.  7. Return to GI Clinic in a couple months.    We discussed the history/results, assessment and plan. Specifically, the nature of the esophageal studies and reasons for them were discussed. ruling out other disease causing dysphagia and documenting need for acid control would be important. She has no symptoms of gastroparesis. Her new medication may be the cause of nausea, possible increase in acid reflux (GERD), as well as constipation.  Questions were answered. Written notes were provided.    There were no barriers to learning found.    Total visit 60 min, counseling time 40 min.            Answers for HPI/ROS submitted by the patient on 4/23/2017   General Symptoms: Yes  Skin Symptoms: Yes  HENT Symptoms: Yes  EYE SYMPTOMS: Yes  HEART SYMPTOMS: Yes  LUNG SYMPTOMS: Yes  INTESTINAL SYMPTOMS: Yes  URINARY SYMPTOMS: Yes  GYNECOLOGIC SYMPTOMS: Yes  BREAST SYMPTOMS: No  SKELETAL SYMPTOMS: Yes  BLOOD SYMPTOMS: No  NERVOUS SYSTEM SYMPTOMS: Yes  MENTAL HEALTH SYMPTOMS: Yes  Fever: No  Loss of appetite: No  Weight loss: No  Weight gain: Yes  Fatigue: Yes  Night sweats: Yes  Chills: No  Increased stress: Yes  Excessive hunger: No  Excessive thirst: No  Feeling hot or cold when others believe the temperature is normal: No  Loss of height:  No  Post-operative complications: No  Surgical site pain: No  Hallucinations: No  Change in or Loss of Energy: Yes  Hyperactivity: No  Confusion: No  Changes in hair: Yes  Changes in moles/birth marks: No  Itching: No  Rashes: No  Changes in nails: No  Acne: No  Hair in places you don't want it: No  Change in facial hair: No  Warts: No  Non-healing sores: No  Scarring: No  Flaking of skin: Yes  Color changes of hands/feet in cold : No  Sun sensitivity: No  Skin thickening: No  Ear pain: No  Ear discharge: No  Hearing loss: Yes  Tinnitus: Yes  Nosebleeds: No  Congestion: No  Sinus pain: Yes  Trouble swallowing: No   Voice hoarseness: No  Mouth sores: No  Sore throat: No  Tooth pain: No  Gum tenderness: No  Bleeding gums: No  Change in taste: Yes  Change in sense of smell: No  Dry mouth: No  Hearing aid used: Yes  Neck lump: No  Eye pain: No  Vision loss: No  Dry eyes: Yes  Watery eyes: No  Eye bulging: No  Double vision: No  Flashing of lights: No  Spots: No  Floaters: Yes  Redness: No  Crossed eyes: No  Tunnel Vision: No  Yellowing of eyes: No  Eye irritation: No  Cough: No  Sputum or phlegm: No  Coughing up blood: No  Difficulty breating or shortness of breath: Yes  Snoring: Yes  Wheezing: No  Difficulty breathing on exertion: Yes  Respiratory pain: No  Nighttime Cough: No  Difficulty breathing when lying flat: No  Chest pain or pressure: No  Fast or irregular heartbeat: Yes  Pain in legs with walking: No  Swelling in feet or ankles: No  Trouble breathing while lying down: No  Fingers or Toes appear blue: No  High blood pressure: No  Low blood pressure: No  Fainting: No  Murmurs: No  Chest pain on exertion: No  Chest pain at rest: No  Cramping pain in leg during exercise: No  Pacemaker: No  Varicose veins: Yes  Edema or swelling: Yes  Fast heart beat: No  Wake up at night with shortness of breath: No  Heart flutters: Yes  Light-headedness: No  Exercise intolerance: No  Heart burn or indigestion: Yes  Nausea:  Yes  Vomiting: No  Abdominal pain: Yes  Bloating: Yes  Constipation: Yes  Diarrhea: No  Blood in stool: No  Black stools: No  Rectal or Anal pain: Yes  Fecal incontinence: No  Rectal bleeding: No  Yellowing of skin or eyes: No  Vomit with blood: No  Change in stools: No  Hemorrhoids: Yes  Trouble holding urine or incontinence: No  Pain or burning: No  Trouble starting or stopping: Yes  Blood in urine: No  Decreased frequency of urination: No  Frequent nighttime urination: No  Flank pain: No  Difficulty emptying bladder: Yes  Back pain: Yes  Muscle aches: Yes  Neck pain: Yes  Swollen joints: Yes  Joint pain: Yes  Bone pain: No  Muscle cramps: Yes  Muscle weakness: Yes  Joint stiffness: Yes  Bone fracture: No  Trouble with coordination: No  Dizziness or trouble with balance: Yes  Fainting or black-out spells: No  Memory loss: No  Headache: Yes  Seizures: No  Speech problems: No  Tingling: No  Tremor: No  Weakness: Yes  Difficulty walking: No  Paralysis: No  Numbness: No  Bleeding or spotting between periods: No  Heavy or painful periods: No  Irregular periods: No  Vaginal discharge: No  Hot flashes: Yes  Vaginal dryness: Yes  Genital ulcers: No  Reduced libido: Yes  Painful intercourse: Yes  Difficulty with sexual arousal: Yes  Post-menopausal bleeding: No  Nervous or Anxious: Yes  Depression: No  Trouble sleeping: Yes  Trouble thinking or concentrating: Yes  Mood changes: Yes  Panic attacks: No

## 2017-05-15 LAB — COPATH REPORT: NORMAL

## 2017-08-04 ASSESSMENT — ENCOUNTER SYMPTOMS
DIARRHEA: 1
WEIGHT GAIN: 0
NAUSEA: 1
POSTURAL DYSPNEA: 0
SPEECH CHANGE: 0
DECREASED LIBIDO: 1
NUMBNESS: 0
CONSTIPATION: 1
WEAKNESS: 0
DECREASED APPETITE: 0
PARALYSIS: 0
ORTHOPNEA: 0
HYPOTENSION: 0
DYSURIA: 1
WEIGHT LOSS: 0
LEG SWELLING: 0
WHEEZING: 0
SLEEP DISTURBANCES DUE TO BREATHING: 0
SPUTUM PRODUCTION: 0
FATIGUE: 1
DEPRESSION: 0
BOWEL INCONTINENCE: 0
RECTAL PAIN: 0
MUSCLE CRAMPS: 1
EYE REDNESS: 0
JAUNDICE: 0
TREMORS: 0
HOARSE VOICE: 0
RECTAL BLEEDING: 0
HALLUCINATIONS: 0
PALPITATIONS: 1
LIGHT-HEADEDNESS: 1
VOMITING: 0
BACK PAIN: 1
EXERCISE INTOLERANCE: 0
INCREASED ENERGY: 1
ALTERED TEMPERATURE REGULATION: 0
BLOATING: 1
MEMORY LOSS: 0
HEADACHES: 1
NECK PAIN: 1
DIFFICULTY URINATING: 1
ARTHRALGIAS: 1
FLANK PAIN: 0
COUGH: 0
POLYDIPSIA: 0
NECK MASS: 0
HEMATURIA: 0
SYNCOPE: 0
TACHYCARDIA: 1
POLYPHAGIA: 0
CHILLS: 0
SINUS PAIN: 0
HYPERTENSION: 0
HOT FLASHES: 1
EYE IRRITATION: 0
MYALGIAS: 1
NIGHT SWEATS: 0
JOINT SWELLING: 1
SORE THROAT: 1
LOSS OF CONSCIOUSNESS: 0
ABDOMINAL PAIN: 1
MUSCLE WEAKNESS: 1
SINUS CONGESTION: 0
SEIZURES: 0
INSOMNIA: 1
HEARTBURN: 0
DIZZINESS: 1
DISTURBANCES IN COORDINATION: 0
COUGH DISTURBING SLEEP: 0
SHORTNESS OF BREATH: 0
RESPIRATORY PAIN: 0
EYE WATERING: 0
SMELL DISTURBANCE: 0
TASTE DISTURBANCE: 0
DYSPNEA ON EXERTION: 1
DOUBLE VISION: 0
DECREASED CONCENTRATION: 1
CLAUDICATION: 0
PANIC: 0
LEG PAIN: 0
EYE PAIN: 0
BLOOD IN STOOL: 0
FEVER: 0
HEMOPTYSIS: 0
STIFFNESS: 1
TROUBLE SWALLOWING: 0
NERVOUS/ANXIOUS: 0
TINGLING: 1
SNORES LOUDLY: 0

## 2017-08-11 ENCOUNTER — OFFICE VISIT (OUTPATIENT)
Dept: GASTROENTEROLOGY | Facility: CLINIC | Age: 57
End: 2017-08-11

## 2017-08-11 VITALS
HEART RATE: 82 BPM | WEIGHT: 155 LBS | DIASTOLIC BLOOD PRESSURE: 68 MMHG | SYSTOLIC BLOOD PRESSURE: 109 MMHG | HEIGHT: 64 IN | BODY MASS INDEX: 26.46 KG/M2 | OXYGEN SATURATION: 98 %

## 2017-08-11 DIAGNOSIS — K21.00 GASTROESOPHAGEAL REFLUX DISEASE WITH ESOPHAGITIS: Primary | ICD-10-CM

## 2017-08-11 RX ORDER — NICOTINE POLACRILEX 4 MG/1
20 GUM, CHEWING ORAL DAILY
Qty: 90 TABLET | Refills: 3 | Status: SHIPPED | OUTPATIENT
Start: 2017-08-11

## 2017-08-11 RX ORDER — PHENTERMINE HYDROCHLORIDE 37.5 MG/1
37.5 TABLET ORAL
COMMUNITY

## 2017-08-11 ASSESSMENT — PAIN SCALES - GENERAL: PAINLEVEL: MODERATE PAIN (5)

## 2017-08-11 NOTE — LETTER
8/11/2017       RE: Jaylene Pillai  [address] [facility] [address]     Dear Colleague,    Thank you for referring your patient, Jaylene Pillai, to the [facility] at [facility]. Please see a copy of my visit note below.    CHIEF COMPLAINT: follow up constipation, bloat, acid reflux (GERD).    HPI: Jaylene is a 57b year old woman last seen in spring and referred for esophageal symptoms for esophageal motility and 24 hour pH/Impedance, given trial of linzess and other instructions. She returns as planned.    Jaylene is belching a lot. She feels weaker and more exhausted. The linaclotide (Linzess) is not quite as good. Clearly, the phentermine increases dry mouth, dry eyes, and constipation and also nausea.    Medical History: Reviewed.   Surgical History: Reviewed.   Medications: Reviewed.  Adverse Drug Reactions: Reviewed.    REVIEW OF SYSTEMS: Review of Systems   Constitutional: Negative for chills, fever and weight loss.   HENT: Positive for hearing loss, sore throat and tinnitus. Negative for ear discharge, ear pain and nosebleeds.    Eyes: Negative for double vision, pain and redness.   Respiratory: Negative for cough, hemoptysis, sputum production, shortness of breath and wheezing.    Cardiovascular: Positive for palpitations. Negative for chest pain, orthopnea and claudication.   Gastrointestinal: Positive for abdominal pain, constipation, diarrhea and nausea. Negative for blood in stool, heartburn, melena and vomiting.   Genitourinary: Positive for dysuria and urgency. Negative for flank pain and hematuria.   Musculoskeletal: Positive for back pain, myalgias and neck pain.   Neurological: Positive for dizziness, tingling and headaches. Negative for tremors, speech change, seizures, loss of consciousness and weakness.   Endo/Heme/Allergies: Negative for polydipsia.   Psychiatric/Behavioral: Negative for depression, hallucinations and memory loss.  "The patient has insomnia. The patient is not nervous/anxious.      Her rash is back, flanks and across lower chest, no change from Ivarest. Decrease with steroids given for other reason (poison ivy).    OBJECTIVE: /68  Pulse 82  Ht 1.626 m (5' 4\")  Wt 70.3 kg (155 lb)  SpO2 98%  BMI 26.61 kg/m2  Clean, pleasant, well-nourished woman who does not show distress, or perhaps appears slightly tired.  Eyes are clear.  Breathing is calm and grossly normal.  Not otherwise examined.     RESULTS:  Esophageal pH/impedance with DeMeester score 21.2 (normal <14.2)  Upper GI endoscopy (EGD) with small hiatal hernia and LA grade A esophagitis, (smal amount of redness)    ASSESSMENT:   57 year old Woman with constipation at least partially secondary to medication(s) and esophageal symptoms, belch, dyspepsia, LA A esophagitis and DeMeester 21.    PLAN:    Continue management of constipation.  Take omeprazole (Prilosec) 20 mg once daily 30-60 before a meal.   Do you have less burping and belching with this.  Follow acid reflux (GERD) precautions.  Re small hiatal hernia also: Do not over eat and do not eat and lie down.     Constipation worse with the phentermine. Also, the dry eye and dry mouth could be increased with the medication(s)   But could also be with a Sjogren's syndrome. Ask her Rheumatologist /office should this be considered at the Rheum office or by ENT  Swallowing precautions for esophageal dysmotility and for after anti-reflux surgery.   Return to GI Clinic as needed  Call or write for issues or to inform us of a new diagnosis.        We discussed the history/results, assessment and plan. Questions were answered. Written notes were provided.    Total visit 25 minutes with counseling time 18 minutes.    Again, thank you for allowing me to participate in the care of your patient.      Sincerely,    YAHIR Mehta CNP      "

## 2017-08-11 NOTE — NURSING NOTE
"Chief Complaint   Patient presents with     RECHECK     chronic constipation       Vitals:    08/11/17 1248   BP: 109/68   Pulse: 82   SpO2: 98%   Weight: 70.3 kg (155 lb)   Height: 1.626 m (5' 4\")       Body mass index is 26.61 kg/(m^2).                            "

## 2017-08-11 NOTE — MR AVS SNAPSHOT
"              After Visit Summary   8/11/2017    Jaylene Pillai    MRN: [id number]           Patient Information     Date Of Birth          1960        Visit Information        Provider Department      8/11/2017 1:00 PM Selena Aguilar, APRN CNP [facility]        Today's Diagnoses     Gastroesophageal reflux disease with esophagitis    -  1      Care Instructions    You have significant acid reflux   with DeMeester score 21.2 (normal <14.2)  With LA grade A esophagitis, (smal amount of redness)    NOw take omeprazole (Prilosec) 20 mg once daily 30-60 before a meal.  Do you have less burping and belching with this.     You have a small hiatal hernia.  Do not over eat and do not eat and lie down.  These are the behaviors that can make it worse.    You constipation may be worse with the phentermine and you have figured that out.  The dry eye and dry mouth could be increased with the medication(s)   But could also be with a Sjogren's syndrome.   Ask your Rheumatologist /office should this be considered at the Rheum office or by ENT    Swallowing precautions for esophageal dysmotility and for after anti-reflux surgery.    (poor muscle action in the esophagus)   Take small bites.   Chew well   Moisten solid or dry food with applesauce, yogurt or other sauces.   Take sips of water between bites.   \"Swallow\" twice for each bite of food.   Wait for each bite to go down before taking the next bite.   Drink liquids slowly, a swallow at a time, rather than in continuous gulps.    More advanced actions:  A dietitian visit is recommended if a person is not able to eat usual meals.  Eat only very soft or blenderized food.  Use balanced nutritional supplements for calories if weight loss becomes an issue.    These include [place] Instant Breakfast, Ensure, or Boost.     Return to GI Clinic as needed  Call or write for issues or to inform us of a new diagnosis.       Selena Aguilar, " "[facility]   For appointments call Angelina, [phone]  Coordinator  [phone] Sharon or call -  GI Nurse Triage  [phone] for Medical Questions, # 3  Fax results to                 Follow-ups after your visit        Follow-up notes from your care team     Return if symptoms worsen or fail to improve.      Who to contact     Please call your clinic at [phone] to:    Ask questions about your health    Make or cancel appointments    Discuss your medicines    Learn about your test results    Speak to your doctor   If you have compliments or concerns about an experience at your clinic, or if you wish to file a complaint, please contact [facility] Patient Relations at [phone] or email us at [email or web address]         Additional Information About Your Visit        X-IOhart Information     Buedat gives you secure access to your electronic health record. If you see a primary care provider, you can also send messages to your care team and make appointments. If you have questions, please call your primary care clinic.  If you do not have a primary care provider, please call [phone] and they will assist you.      Seesmic is an electronic gateway that provides easy, online access to your medical records. With Seesmic, you can request a clinic appointment, read your test results, renew a prescription or communicate with your care team.     To access your existing account, please contact your [facility] or call [phone] for assistance.        Care EveryWhere ID     This is your Care EveryWhere ID. This could be used by other organizations to access your [facility] medical records  [id number]        Your Vitals Were     Pulse Height Pulse Oximetry BMI (Body Mass Index)          82 1.626 m (5' 4\") 98% 26.61 kg/m2         Blood Pressure from Last 3 Encounters:   08/11/17 109/68   04/28/17 129/74   10/31/12 114/74    " Weight from Last 3 Encounters:   08/11/17 70.3 kg (155 lb)   04/28/17 70.3 kg (155 lb)   10/31/12 70.3 kg (155 lb)              Today, you had the following     No orders found for display         Today's Medication Changes          These changes are accurate as of: 8/11/17  2:08 PM.  If you have any questions, ask your nurse or doctor.               Start taking these medicines.        Dose/Directions    omeprazole 20 MG tablet   Used for:  Gastroesophageal reflux disease with esophagitis   Started by:  Selena Aguilar APRN CNP        Dose:  20 mg   Take 1 tablet (20 mg) by mouth daily   Quantity:  90 tablet   Refills:  3         These medicines have changed or have updated prescriptions.        Dose/Directions    Thyroid 81.25 MG Tabs   Indication:  Underactive Thyroid   This may have changed:  Another medication with the same name was removed. Continue taking this medication, and follow the directions you see here.   Changed by:  Selena Aguilar APRN CNP        Dose:  81.25 mg   Take 81.25 mg by mouth 2 times daily   Refills:  0            Where to get your medicines      These medications were sent to [facility] [facility], [place] - [address] AT [facility] [address]  [address], [facility] [address]     Phone:  [phone]     omeprazole 20 MG tablet                Primary Care Provider Office Phone # Fax #    Toño Ambriz [phone]       [facility] [address] [facility]  [address] [address]        Equal Access to Services     RAYMOND JOYCE AH: Hadii yudi garciao Sobeatrice, waaxda luqadaha, qaybta kaalmada adeegyada, tye strong. So [facility] [phone].    ATENCIÓN: Si habla kathy, tiene a workman disposición servicios gratuitos de asistencia lingüística. Mariely al [phone].    We comply with applicable federal civil rights laws and [place] laws. We do not discriminate on the basis of race, color, national origin, age, disability sex,  sexual orientation or gender identity.            Thank you!     Thank you for choosing [facility]  for your care. Our goal is always to provide you with excellent care. Hearing back from our patients is one way we can continue to improve our services. Please take a few minutes to complete the written survey that you may receive in the mail after your visit with us. Thank you!             Your Updated Medication List - Protect others around you: Learn how to safely use, store and throw away your medicines at www.disposemymeds.org.          This list is accurate as of: 8/11/17  2:08 PM.  Always use your most recent med list.                   Brand Name Dispense Instructions for use Diagnosis    fish oil-omega-3 fatty acids 1000 MG capsule      Take 3 g by mouth 3 times daily        LINZESS PO      Take 290 mcg by mouth every morning (before breakfast)        magnesium 250 MG tablet      Take 1 tablet by mouth daily        metFORMIN 500 MG 24 hr tablet    GLUCOPHAGE-XR     Take 1,000 mg by mouth daily        omeprazole 20 MG tablet     90 tablet    Take 1 tablet (20 mg) by mouth daily    Gastroesophageal reflux disease with esophagitis       phentermine 37.5 MG tablet    ADIPEX-P     Take 37.5 mg by mouth every morning (before breakfast)        PREMARIN PO      Take 0.625 mg by mouth daily        PRO-BIOTIC BLEND Caps      Take 1 capsule by mouth 3 times daily        Thyroid 81.25 MG Tabs      Take 81.25 mg by mouth 2 times daily        TRAZODONE HCL      100 mg at bedtime        ULTRAM PO      Reported on 4/28/2017        VITAMIN D PO      Take  by mouth. 2000 mg

## 2017-08-11 NOTE — PATIENT INSTRUCTIONS
"You have significant acid reflux   with DeMeester score 21.2 (normal <14.2)  With LA grade A esophagitis, (smal amount of redness)    NOw take omeprazole (Prilosec) 20 mg once daily 30-60 before a meal.  Do you have less burping and belching with this.     You have a small hiatal hernia.  Do not over eat and do not eat and lie down.  These are the behaviors that can make it worse.    You constipation may be worse with the phentermine and you have figured that out.  The dry eye and dry mouth could be increased with the medication(s)   But could also be with a Sjogren's syndrome.   Ask your Rheumatologist /office should this be considered at the Rheum office or by ENT    Swallowing precautions for esophageal dysmotility and for after anti-reflux surgery.    (poor muscle action in the esophagus)   Take small bites.   Chew well   Moisten solid or dry food with applesauce, yogurt or other sauces.   Take sips of water between bites.   \"Swallow\" twice for each bite of food.   Wait for each bite to go down before taking the next bite.   Drink liquids slowly, a swallow at a time, rather than in continuous gulps.    More advanced actions:  A dietitian visit is recommended if a person is not able to eat usual meals.  Eat only very soft or blenderized food.  Use balanced nutritional supplements for calories if weight loss becomes an issue.    These include [place] Instant Breakfast, Ensure, or Boost.     Return to GI Clinic as needed  Call or write for issues or to inform us of a new diagnosis.       [facility]   For appointments call Angelina, [phone]  Coordinator  [phone] Sharon or call -  GI Nurse Triage  [phone] for Medical Questions, # 3  Fax results to         "

## 2017-11-19 ENCOUNTER — HEALTH MAINTENANCE LETTER (OUTPATIENT)
Age: 57
End: 2017-11-19

## 2018-02-19 ASSESSMENT — ENCOUNTER SYMPTOMS
SORE THROAT: 1
FEVER: 0
HEMATURIA: 0
ABDOMINAL PAIN: 1
NECK PAIN: 1
NERVOUS/ANXIOUS: 0
HEARTBURN: 0
WEAKNESS: 0
MYALGIAS: 1
CONSTIPATION: 1
TINGLING: 1
EYE REDNESS: 0
DOUBLE VISION: 0
SHORTNESS OF BREATH: 0
CLAUDICATION: 0
DIARRHEA: 1
VOMITING: 0
SPEECH CHANGE: 0
BLOOD IN STOOL: 0
EYE PAIN: 0
SEIZURES: 0
HEMOPTYSIS: 0
CHILLS: 0
DEPRESSION: 0
PALPITATIONS: 1
ORTHOPNEA: 0
COUGH: 0
BACK PAIN: 1
INSOMNIA: 1
HEADACHES: 1
HALLUCINATIONS: 0
WHEEZING: 0
FLANK PAIN: 0
DYSURIA: 1
LOSS OF CONSCIOUSNESS: 0
MEMORY LOSS: 0
NAUSEA: 1
SPUTUM PRODUCTION: 0
TREMORS: 0
WEIGHT LOSS: 0
POLYDIPSIA: 0
DIZZINESS: 1

## 2018-02-19 NOTE — PROGRESS NOTES
"CHIEF COMPLAINT: follow up constipation, bloat, acid reflux (GERD).    HPI: Jaylene is a 57b year old woman last seen in spring and referred for esophageal symptoms for esophageal motility and 24 hour pH/Impedance, given trial of linzess and other instructions. She returns as planned.    Jaylene is belching a lot. She feels weaker and more exhausted. The linaclotide (Linzess) is not quite as good. Clearly, the phentermine increases dry mouth, dry eyes, and constipation and also nausea.    Medical History: Reviewed.   Surgical History: Reviewed.   Medications: Reviewed.  Adverse Drug Reactions: Reviewed.    REVIEW OF SYSTEMS: Review of Systems   Constitutional: Negative for chills, fever and weight loss.   HENT: Positive for hearing loss, sore throat and tinnitus. Negative for ear discharge, ear pain and nosebleeds.    Eyes: Negative for double vision, pain and redness.   Respiratory: Negative for cough, hemoptysis, sputum production, shortness of breath and wheezing.    Cardiovascular: Positive for palpitations. Negative for chest pain, orthopnea and claudication.   Gastrointestinal: Positive for abdominal pain, constipation, diarrhea and nausea. Negative for blood in stool, heartburn, melena and vomiting.   Genitourinary: Positive for dysuria and urgency. Negative for flank pain and hematuria.   Musculoskeletal: Positive for back pain, myalgias and neck pain.   Neurological: Positive for dizziness, tingling and headaches. Negative for tremors, speech change, seizures, loss of consciousness and weakness.   Endo/Heme/Allergies: Negative for polydipsia.   Psychiatric/Behavioral: Negative for depression, hallucinations and memory loss. The patient has insomnia. The patient is not nervous/anxious.      Her rash is back, flanks and across lower chest, no change from Ivarest. Decrease with steroids given for other reason (poison ivy).    OBJECTIVE: /68  Pulse 82  Ht 1.626 m (5' 4\")  Wt 70.3 kg (155 lb)  SpO2 98%  BMI " 26.61 kg/m2  Clean, pleasant, well-nourished woman who does not show distress, or perhaps appears slightly tired.  Eyes are clear.  Breathing is calm and grossly normal.  Not otherwise examined.     RESULTS:  Esophageal pH/impedance with DeMeester score 21.2 (normal <14.2)  Upper GI endoscopy (EGD) with small hiatal hernia and LA grade A esophagitis, (smal amount of redness)    ASSESSMENT:   57 year old Woman with constipation at least partially secondary to medication(s) and esophageal symptoms, belch, dyspepsia, LA A esophagitis and DeMeester 21.    PLAN:    Continue management of constipation.  Take omeprazole (Prilosec) 20 mg once daily 30-60 before a meal.   Do you have less burping and belching with this.  Follow acid reflux (GERD) precautions.  Re small hiatal hernia also: Do not over eat and do not eat and lie down.     Constipation worse with the phentermine. Also, the dry eye and dry mouth could be increased with the medication(s)   But could also be with a Sjogren's syndrome. Ask her Rheumatologist /office should this be considered at the Rheum office or by ENT  Swallowing precautions for esophageal dysmotility and for after anti-reflux surgery.   Return to GI Clinic as needed  Call or write for issues or to inform us of a new diagnosis.        We discussed the history/results, assessment and plan. Questions were answered. Written notes were provided.    Total visit 25 minutes with counseling time 18 minutes.

## 2018-08-02 DIAGNOSIS — K21.00 GASTROESOPHAGEAL REFLUX DISEASE WITH ESOPHAGITIS: ICD-10-CM

## 2018-08-04 NOTE — TELEPHONE ENCOUNTER
omeprazole 20 MG tablet  Last Written Prescription Date:  8/11/17  Last Fill Quantity: 90,   # refills: 3  Last Office Visit :8/11/17  Future Office visit: none    Scheduling has been notified to contact the pt for appointment.    Routing  Because: S Borne gone. Who to sign?

## 2018-08-06 ENCOUNTER — TELEPHONE (OUTPATIENT)
Dept: GASTROENTEROLOGY | Facility: CLINIC | Age: 58
End: 2018-08-06

## 2018-08-06 NOTE — TELEPHONE ENCOUNTER
LVM for patient in regards to scheduling clinic visit. Informed patient she will need to establish care with new provider since Selena Aguilar is no longer with clinic. Left call back number for patient to call and schedule appointment.

## 2018-08-07 ENCOUNTER — TELEPHONE (OUTPATIENT)
Dept: GASTROENTEROLOGY | Facility: CLINIC | Age: 58
End: 2018-08-07

## 2018-08-07 NOTE — TELEPHONE ENCOUNTER
LVM #2 for patient in regards to scheduling clinic visit. Informed patient she will need to establish care with new provider since Selena Aguilar is no longer with clinic. Left call back number for patient to call and schedule appointment.

## 2018-08-16 RX ORDER — NICOTINE POLACRILEX 4 MG/1
20 GUM, CHEWING ORAL DAILY
Qty: 30 TABLET | Refills: 0 | OUTPATIENT
Start: 2018-08-16

## 2018-08-17 NOTE — TELEPHONE ENCOUNTER
Pt has not been seen in over one year. May go to primary care. Pt has been contacted more than once to make an appt.

## 2020-03-02 ENCOUNTER — HEALTH MAINTENANCE LETTER (OUTPATIENT)
Age: 60
End: 2020-03-02

## 2020-12-14 ENCOUNTER — HEALTH MAINTENANCE LETTER (OUTPATIENT)
Age: 60
End: 2020-12-14

## 2021-04-18 ENCOUNTER — HEALTH MAINTENANCE LETTER (OUTPATIENT)
Age: 61
End: 2021-04-18

## 2021-10-02 ENCOUNTER — HEALTH MAINTENANCE LETTER (OUTPATIENT)
Age: 61
End: 2021-10-02

## 2022-03-19 ENCOUNTER — HEALTH MAINTENANCE LETTER (OUTPATIENT)
Age: 62
End: 2022-03-19

## 2022-05-14 ENCOUNTER — HEALTH MAINTENANCE LETTER (OUTPATIENT)
Age: 62
End: 2022-05-14

## 2022-09-03 ENCOUNTER — HEALTH MAINTENANCE LETTER (OUTPATIENT)
Age: 62
End: 2022-09-03

## 2023-06-03 ENCOUNTER — HEALTH MAINTENANCE LETTER (OUTPATIENT)
Age: 63
End: 2023-06-03